# Patient Record
(demographics unavailable — no encounter records)

---

## 2025-04-29 NOTE — ASSESSMENT
[FreeTextEntry1] : 12 y/o male who jammed his right ring finger playing soccer goalie with injury at the PIP joint which may be a sprain.  There can be a nondisplaced Salter I Yanez fracture at the growth plate.  He had minimal tenderness today so it is clearly getting much better since it occurred a week ago 8 days ago.  He will continue with this implant for another week or 2 and then can amanda tape to the middle finger for another week or 2.  It should be fully better in the next 3 to 4 weeks. In the future you will get polio gloves to wear to help protect his fingers. He has a test at school this week and was able to write with a pen using his thumb index and middle finger. He can remove the splint a few times a day and shower and bathe without it and start bending the finger as he feels comfortable to work through the stiffness. I will call them after I see the report on the x-rays.  If his finger is not fully better in the next 3 weeks he should come in for follow-up

## 2025-04-29 NOTE — PHYSICAL EXAM
[UE] : Sensory: Intact in bilateral upper extremities [Normal RUE] : Right Upper Extremity: No scars, rashes, lesions, ulcers, skin intact [Normal LUE] : Left Upper Extremity: No scars, rashes, lesions, ulcers, skin intact [Normal Touch] : sensation intact for touch [Normal] : Oriented to person, place, and time, insight and judgement were intact and the affect was normal [de-identified] : RIGHT hand and left for comparison Mild edema around the ring finger PIP joint without ecchymoses or erythema.  Skin is intact. Mildly tender dorsal ring finger PIP joint Full extension of the IP joints without any lag.  He can flex with some stiffness at the PIP joint not quite fully flexing.  No rotational deformity. Normal motor and sensory exam. Finger is warm with normal capillary refill. [de-identified] :  X-rays ordered, performed and reviewed today of RIGHT ring finger 3 views for a finger injury showed open growth plates.  No fracture is seen. I requested to get the report or films from [facility] to review but they saw

## 2025-04-29 NOTE — PHYSICAL EXAM
[UE] : Sensory: Intact in bilateral upper extremities [Normal RUE] : Right Upper Extremity: No scars, rashes, lesions, ulcers, skin intact [Normal LUE] : Left Upper Extremity: No scars, rashes, lesions, ulcers, skin intact [Normal Touch] : sensation intact for touch [Normal] : Oriented to person, place, and time, insight and judgement were intact and the affect was normal [de-identified] : RIGHT hand and left for comparison Mild edema around the ring finger PIP joint without ecchymoses or erythema.  Skin is intact. Mildly tender dorsal ring finger PIP joint Full extension of the IP joints without any lag.  He can flex with some stiffness at the PIP joint not quite fully flexing.  No rotational deformity. Normal motor and sensory exam. Finger is warm with normal capillary refill. [de-identified] :  X-rays ordered, performed and reviewed today of RIGHT ring finger 3 views for a finger injury showed open growth plates.  No fracture is seen. I requested to get the report or films from [facility] to review but they saw

## 2025-04-29 NOTE — HISTORY OF PRESENT ILLNESS
[de-identified] : 11 1/7 y/o comes in for evaluation for RIGHT finger injury that occurred on 4/21/25. He was playing soccer as a goalie and the ball hit his right ring finger. He went to urgent care the following day. Initial read did not see fracture but was called the next day stating they did suspect a fracture. They do not have images with them today. He has been using a finger splint. His pain is 4/10 and it does hurt to bend it.

## 2025-04-29 NOTE — ASSESSMENT
[FreeTextEntry1] : 10 y/o male who jammed his right ring finger playing soccer goalie with injury at the PIP joint which may be a sprain.  There can be a nondisplaced Salter I Yanez fracture at the growth plate.  He had minimal tenderness today so it is clearly getting much better since it occurred a week ago 8 days ago.  He will continue with this implant for another week or 2 and then can amanda tape to the middle finger for another week or 2.  It should be fully better in the next 3 to 4 weeks. In the future you will get polio gloves to wear to help protect his fingers. He has a test at school this week and was able to write with a pen using his thumb index and middle finger. He can remove the splint a few times a day and shower and bathe without it and start bending the finger as he feels comfortable to work through the stiffness. I will call them after I see the report on the x-rays.  If his finger is not fully better in the next 3 weeks he should come in for follow-up

## 2025-04-29 NOTE — HISTORY OF PRESENT ILLNESS
[de-identified] : 11 1/7 y/o comes in for evaluation for RIGHT finger injury that occurred on 4/21/25. He was playing soccer as a goalie and the ball hit his right ring finger. He went to urgent care the following day. Initial read did not see fracture but was called the next day stating they did suspect a fracture. They do not have images with them today. He has been using a finger splint. His pain is 4/10 and it does hurt to bend it.